# Patient Record
Sex: MALE | Race: WHITE | NOT HISPANIC OR LATINO | Employment: UNEMPLOYED | URBAN - METROPOLITAN AREA
[De-identification: names, ages, dates, MRNs, and addresses within clinical notes are randomized per-mention and may not be internally consistent; named-entity substitution may affect disease eponyms.]

---

## 2024-10-13 ENCOUNTER — HOSPITAL ENCOUNTER (EMERGENCY)
Facility: HOSPITAL | Age: 54
Discharge: HOME | End: 2024-10-13
Attending: EMERGENCY MEDICINE
Payer: COMMERCIAL

## 2024-10-13 VITALS
RESPIRATION RATE: 20 BRPM | SYSTOLIC BLOOD PRESSURE: 147 MMHG | BODY MASS INDEX: 27.2 KG/M2 | HEART RATE: 66 BPM | DIASTOLIC BLOOD PRESSURE: 81 MMHG | OXYGEN SATURATION: 98 % | TEMPERATURE: 98 F | WEIGHT: 190 LBS | HEIGHT: 70 IN

## 2024-10-13 DIAGNOSIS — M79.675 PAIN OF LEFT GREAT TOE: Primary | ICD-10-CM

## 2024-10-13 PROCEDURE — 99284 EMERGENCY DEPT VISIT MOD MDM: CPT | Mod: 25

## 2024-10-13 PROCEDURE — 63600175 PHARM REV CODE 636 W HCPCS: Performed by: STUDENT IN AN ORGANIZED HEALTH CARE EDUCATION/TRAINING PROGRAM

## 2024-10-13 PROCEDURE — 96372 THER/PROPH/DIAG INJ SC/IM: CPT | Performed by: STUDENT IN AN ORGANIZED HEALTH CARE EDUCATION/TRAINING PROGRAM

## 2024-10-13 RX ORDER — KETOROLAC TROMETHAMINE 30 MG/ML
30 INJECTION, SOLUTION INTRAMUSCULAR; INTRAVENOUS
Status: COMPLETED | OUTPATIENT
Start: 2024-10-13 | End: 2024-10-13

## 2024-10-13 RX ORDER — NAPROXEN 500 MG/1
500 TABLET ORAL 2 TIMES DAILY WITH MEALS
Qty: 28 TABLET | Refills: 0 | Status: SHIPPED | OUTPATIENT
Start: 2024-10-13 | End: 2024-10-27

## 2024-10-13 RX ADMIN — KETOROLAC TROMETHAMINE 30 MG: 30 INJECTION, SOLUTION INTRAMUSCULAR at 11:10

## 2024-10-14 NOTE — ED NOTES
PT TO ED FOR L FOOT PAIN. PT STATES THE PAIN STARTED 2 DAYS AGO WHEN HE GOT OUT OF BED. STATES THE PAIN IS LOCATED AT THE BASE OF THE BIG TOE. PT STATES HIS DIET IS HEAVY WITH RED MEAT AND MULTIPLE BEERS A DAY.

## 2024-10-14 NOTE — DISCHARGE INSTRUCTIONS
Problem Specific Instructions:  Any bone/joint/muscle injury, regardless of broken bones or not may take between 4-6 weeks to heal.  You may ice it throughout the day, compress it with something like an Ace wrap or compression sleeve and elevate it above your heart regularly to prevent or reduce swelling. If you are not improving in that time, follow-up with your primary care provider or orthopedics for re-evaluation. Return to the Emergency Department if you experience worsening pain, numbness, tingling, change of color in the body part, or any other concerning symptoms.     If you would like to follow up with the [facility] for further care of your fracture, please call the [facility] Scheduling Department at [phone] during business hours. Please let the  know you need a fracture follow-up appointment with Orthopedics, and you will be scheduled in the Orthopedic Clinic. Please bring your original Emergency Department discharge papers and disc with you to the clinic appointment.     If you received or are discharged with pain medicine or muscle relaxers, understand that they can make you sleepy or impair your judgement. Do not make important decisions, drink, drive, swim or perform any other tasks you would not otherwise perform while impaired for at least 24 hours after your last dose.      Ensure you follow up with your Primary Care Provider or any additional providers listed on this discharge sheet. While you may be healthy enough to go home today, I cannot predict the exact course of your diagnoses. It is important to remember that some problems are difficult to diagnose and may not be found during your first visit. As such, it is your responsibility to monitor symptoms, follow-up with another healthcare provider, or return to the emergency room for new or worsening concerns. Unless otherwise instructed, continue all home medications and any new medications prescribed to  you in the Emergency Department.

## 2024-10-14 NOTE — ED PROVIDER NOTES
"Encounter Date: 10/13/2024       History     Chief Complaint   Patient presents with    Foot Pain     Pt reports left foot pain/soreness since yesterday. Denies any recent injuries or falls. Pt reports "I dont have a hx of gout but I think I might have gout"      54 y.o. male with history below presents for evaluation of left great toe pain.  Symptoms began 2 days ago.  Worse with ambulation.  Describes as an aching pain.  No fevers or chills.  No swelling.  Denies trauma.  Symptoms never happened before.  No history of gout.  Patient lives in [place], is here for work.  He will return to [place] in 2 weeks.    Triage vitals were reviewed and are: Initial Vitals [10/13/24 2222]  BP: (!) 147/81  Pulse: 66  Resp: 20  Temp: 98.4 °F (36.9 °C)  SpO2: 98 %  MAP: n/a    The Patient:   has no past medical history on file.   has no past surgical history on file.     Has allergies listed as: Patient has no known allergies.        The history is provided by the patient. No  was used.     Review of patient's allergies indicates:  No Known Allergies  No past medical history on file.  No past surgical history on file.  No family history on file.     Review of Systems   Constitutional:  Negative for chills, fatigue and fever.   HENT:  Negative for congestion, hearing loss, sore throat and trouble swallowing.    Eyes:  Negative for visual disturbance.   Respiratory:  Negative for cough, chest tightness and shortness of breath.    Cardiovascular:  Negative for chest pain.   Gastrointestinal:  Negative for abdominal pain, constipation, diarrhea, nausea and vomiting.   Endocrine: Negative for cold intolerance and heat intolerance.   Genitourinary:  Negative for difficulty urinating and frequency.   Musculoskeletal:  Positive for arthralgias. Negative for myalgias.   Skin:  Negative for rash.   Neurological:  Negative for dizziness and headaches.   Psychiatric/Behavioral:  Negative for suicidal ideas. The patient is not " nervous/anxious.        Physical Exam     Initial Vitals [10/13/24 2222]   BP Pulse Resp Temp SpO2   (!) 147/81 66 20 98.4 °F (36.9 °C) 98 %      MAP       --         Physical Exam    Nursing note and vitals reviewed.  Constitutional: He appears well-developed and well-nourished.   Body mass index is 27.26 kg/m².   HENT:   Head: Normocephalic and atraumatic.   Eyes: EOM are normal. Pupils are equal, round, and reactive to light.   Neck: Neck supple.   Cardiovascular:  Normal rate, regular rhythm and intact distal pulses.           Pulmonary/Chest: No respiratory distress.   Musculoskeletal:      Cervical back: Neck supple.      Comments: Focused exam of the Left ankle and foot:  The b/l malleoli, Lis Franc Joint, base of the 5th metatarsal, navicular, anterior and posterior leg were evaluated. Compartments soft and compressible.    Tenderness at the 1st metatarsophalangeal joint.  No appreciable erythema, induration, edema.    Full AROM: Dorsiflexion 0-20 degrees. Plantar flexion 0-50 degrees.      Skin is warm, dry, intact w/o effusions / edema / erythema / ecchymosis / masses / lesions / obvious deformities / obvious muscle wasting.     Special Tests:  (-) Talar Tilt/Anterior Drawer (Anterior Talofibular/Calcaneal Ligament)  (-) Syndesmotic Squeeze test (High Ankle Sprain)  (-) Dorsiflexion/External Rotation Stress Test (Ankle Syndesmotic Injury/High Ankle)   (-) Canela test (Achilles Rupture)  (-) Windlass Test (Plantar Fasciitis)     Gait is essentially Normal. Ambulates without assistance or assistive device. Able to transfer to and from the exam table without assistance. No obvious leg length discrepancy.  Distal Pulses 2+.     Sensation to Light Touch:  L        R  [2/2]  [2/2]  Medial mid-thigh (L2)  [2/2]  [2/2]  Medial knee (L3)  [2/2]  [2/2]  Medial malleolus (L4)  [2/2]  [2/2]  1st dorsal web space (L5)  [2/2]  [2/2]  Lateral plantar foot (S1)    Motor Strength:   L        R  [5/5]  [5/5]  Hip Flexion  (L2)  [5/5]  [5/5]  Knee Extension (L3)  [5/5]  [5/5]  Ankle Dorsiflexion (L4)  [5/5]  [5/5]  Hallux Dorsiflexion (L5)  [5/5]  [5/5]  Ankle Plantar Flexion (S1)       Neurological: He is alert and oriented to person, place, and time. GCS score is 15. GCS eye subscore is 4. GCS verbal subscore is 5. GCS motor subscore is 6.   Skin: Skin is warm and dry. Capillary refill takes less than 2 seconds.   Psychiatric: He has a normal mood and affect. His behavior is normal.         ED Course   Procedures  Labs Reviewed - No data to display       Imaging Results              X-Ray Foot Complete Left (In process)                      Medications   ketorolac injection 30 mg (has no administration in time range)     Medical Decision Making  Encounter Date: 10/13/2024  --------------------------------------------------------------------------  54 y.o. male presents for evaluation of left great toe pain.  Hemodynamically stable. Afebrile. Phonating and protecting the airway spontaneously. No clinical evidence for cardiovascular instability or impending airway compromise.  Remainder of physical exam as above.    Additional or Independent Historians available and contributing to the history as above: NONE  Prior medical records, when available, were reviewed. This includes a review of the patients comorbidities, medications, and recent hospital or outpatient notes.   Comorbid Conditions affecting evaluation, treatment or discharge planning: NONE IDENTIFIED   Social Determinates of Health identified and considered in the evaluation and treatment of this patient: None Identified or significantly impacting evaluation and treatment    Differential diagnoses includes but is not limited to:   Fracture, dislocation, compartment syndrome, nerve injury/palsy, vascular injury, DVT, rhabdomyolysis, hemarthrosis, septic joint, cellulitis, bursitis, muscle strain, ligament tear/sprain, laceration, foreign body, abrasion, soft tissue contusion,  "osteoarthritis, osteomyelitis.  --------------------------------------------------------------------------  All available clinical tests were reviewed by me and documented in the ED course.  Vitals range:   Temp:  [98.4 °F (36.9 °C)] 98.4 °F (36.9 °C)  Pulse:  [66] 66  Resp:  [20] 20  SpO2:  [98 %] 98 %  BP: (147)/(81) 147/81  Estimated body mass index is 27.26 kg/m² as calculated from the following:    Height as of this encounter: 5' 10" (1.778 m).    Weight as of this encounter: 86.2 kg (190 lb).    ED MEDS GIVEN:  Medications  ketorolac injection 30 mg (has no administration in time range)    Procedures Performed: None  --------------------------------------------------------------------------  Clinical picture today most consistent with left great toe pain.  Symptoms suspicious for possibly the beginning of gout flare however no history of same.  No clinical evidence of septic joint.  No history of diabetes.  Plain films on my independent interpretation negative for acute fracture dislocation.  Will treat symptomatically with Naprosyn.  Follow up with [facility] in [place] with possible evaluation by podiatry once home.  Doubt alternate pathology as listed in the differential above.    I see no indication of an emergent process beyond that addressed during our encounter but have duly counseled the patient/family regarding the need for prompt follow-up as well as the indications that should prompt immediate return to the emergency room should new or worrisome developments occur.  The patient/family has been provided with verbal and printed direction regarding our final diagnosis(es) as well as instructions regarding use of OTC and/or Rx medications intended to manage the patient's conditions.   The patient/family communicates understanding of all this information and all remaining questions and concerns were addressed at this time.  DISCLAIMER: This note was prepared with [employer] voice recognition transcription software. " Garbled syntax, mangled pronouns, and other bizarre constructions may be attributed to that software system.    Final diagnoses:  [M79.675] Pain of left great toe (Primary)                Amount and/or Complexity of Data Reviewed  Radiology: ordered. Decision-making details documented in ED Course.    Risk  Prescription drug management.               ED Course as of 10/13/24 2310   Sun Oct 13, 2024   2301 BP(!): 147/81 [AN]   2301 Temp: 98.4 °F (36.9 °C) [AN]   2301 Pulse: 66 [AN]   2301 Resp: 20 [AN]   2301 SpO2: 98 % [AN]   2301 X-Ray Foot Complete Left  I reviewed the plain films myself. My independent interpretation is as follows:  No evidence of acute fracture or dislocation.   [AN]      ED Course User Index  [AN] Maximo Adams PA-C                           Clinical Impression:  Final diagnoses:  [M79.675] Pain of left great toe (Primary)          ED Disposition Condition    Discharge Stable          ED Prescriptions       Medication Sig Dispense Start Date End Date Auth. Provider    naproxen (NAPROSYN) 500 MG tablet Take 1 tablet (500 mg total) by mouth 2 (two) times daily with meals. for 14 days 28 tablet 10/13/2024 10/27/2024 Maximo Adams PA-C          Follow-up Information       Follow up With Specialties Details Why Contact [facility] Emergency Medicine Go to  As needed, If symptoms worsen [id number] [facility] [place] [address] [phone]    Primary Care Manager  Schedule an appointment as soon as possible for a visit in 1 week               Maximo Adams PA-C  10/13/24 2310